# Patient Record
Sex: FEMALE | Race: WHITE
[De-identification: names, ages, dates, MRNs, and addresses within clinical notes are randomized per-mention and may not be internally consistent; named-entity substitution may affect disease eponyms.]

---

## 2017-08-29 ENCOUNTER — HOSPITAL ENCOUNTER (OUTPATIENT)
Dept: HOSPITAL 47 - RADCTMAIN | Age: 32
Discharge: HOME | End: 2017-08-29
Payer: COMMERCIAL

## 2017-08-29 VITALS — RESPIRATION RATE: 14 BRPM

## 2017-08-29 VITALS — HEART RATE: 77 BPM | SYSTOLIC BLOOD PRESSURE: 120 MMHG | DIASTOLIC BLOOD PRESSURE: 74 MMHG

## 2017-08-29 DIAGNOSIS — R19.4: Primary | ICD-10-CM

## 2017-08-29 NOTE — CT
EXAMINATION TYPE: CT abdomen pelvis w con

 

DATE OF EXAM: 8/29/2017

 

REFERENCE: NONE

 

HISTORY: R10.84 abdominal pain

 

FINDINGS: There was immediate extravasation of contrast. The CT scan was not completed. The patient h
as been rescheduled.

## 2018-03-09 ENCOUNTER — HOSPITAL ENCOUNTER (OUTPATIENT)
Dept: HOSPITAL 47 - RADMRIMAIN | Age: 33
Discharge: HOME | End: 2018-03-09
Payer: COMMERCIAL

## 2018-03-09 DIAGNOSIS — M51.25: Primary | ICD-10-CM

## 2018-03-09 PROCEDURE — 72148 MRI LUMBAR SPINE W/O DYE: CPT

## 2018-03-10 NOTE — MR
EXAMINATION TYPE: MR lumbar spine wo con

 

DATE OF EXAM: 3/9/2018

 

COMPARISON: MRI lumbar spine July 30, 2014.

 

HISTORY: LBP per order, back pain causing lt leg numbness x several years per patient.

 

TECHNIQUE: Multiplanar, multisequence imaging of the lumbar spine is performed without IV contrast.

 

FINDINGS: Sagittal images of the lumbar spine show vertebral body heights and alignment to remain sat
isfactory. There is persistent disc desiccation with mild to moderate disc space narrowing L2-L3 leve
l otherwise the intervertebral discs demonstrate normal heights and hydration. There is posterior dis
c herniation effacing anterior thecal sac T12-L1 level on sagittal image 8 redemonstrated.  The conus
 medullaris is normal in position and signal ending T12-L1 disc space level.  The bone marrow signal 
intensity is within normal limits.

 

Axial images at the T12-L1 level shows broad-based right paracentral disc protrusion effacing anterol
ateral thecal sac on axial image 28, this is not significantly changed from prior study. Bilateral ne
ural foramina remain patent.

 

Axial images at L1-L2, L2-L3, and L3-L4 levels are felt to remain within normal limits.

 

Axial images at L4-L5 level redemonstrate broad-based posterior disc protrusion with minimal facet ar
thropathy bilaterally. Spinal canal is however preserved and bilateral neural foramina remain patent.
 No significant change from prior study.

 

Axial images at L5-S1 level are felt to remain within normal limits.

 

There is 6 mm round T2 hyperintense lesion posteriorly in left kidney axial image 23 felt to reflect 
simple cyst not significantly changed from prior.

 

IMPRESSION: No significant change from prior MRI, disc herniation T12-L1 level not mentioned on prior
 report noted but is felt stable from prior

## 2019-04-11 ENCOUNTER — HOSPITAL ENCOUNTER (OUTPATIENT)
Dept: HOSPITAL 47 - ORWHC2ENDO | Age: 34
Discharge: HOME | End: 2019-04-11
Payer: COMMERCIAL

## 2019-04-11 VITALS — TEMPERATURE: 99 F | RESPIRATION RATE: 16 BRPM

## 2019-04-11 VITALS — SYSTOLIC BLOOD PRESSURE: 103 MMHG | HEART RATE: 77 BPM | DIASTOLIC BLOOD PRESSURE: 76 MMHG

## 2019-04-11 VITALS — BODY MASS INDEX: 44.6 KG/M2

## 2019-04-11 DIAGNOSIS — K21.9: ICD-10-CM

## 2019-04-11 DIAGNOSIS — K57.30: ICD-10-CM

## 2019-04-11 DIAGNOSIS — L98.9: ICD-10-CM

## 2019-04-11 DIAGNOSIS — Z87.891: ICD-10-CM

## 2019-04-11 DIAGNOSIS — R19.7: ICD-10-CM

## 2019-04-11 DIAGNOSIS — Z88.0: ICD-10-CM

## 2019-04-11 DIAGNOSIS — E66.01: ICD-10-CM

## 2019-04-11 DIAGNOSIS — Z91.040: ICD-10-CM

## 2019-04-11 DIAGNOSIS — J45.909: ICD-10-CM

## 2019-04-11 DIAGNOSIS — K29.50: Primary | ICD-10-CM

## 2019-04-11 DIAGNOSIS — G43.909: ICD-10-CM

## 2019-04-11 DIAGNOSIS — Z88.8: ICD-10-CM

## 2019-04-11 DIAGNOSIS — K44.9: ICD-10-CM

## 2019-04-11 PROCEDURE — 88305 TISSUE EXAM BY PATHOLOGIST: CPT

## 2019-04-11 PROCEDURE — 81025 URINE PREGNANCY TEST: CPT

## 2019-04-11 PROCEDURE — 45380 COLONOSCOPY AND BIOPSY: CPT

## 2019-04-11 PROCEDURE — 43239 EGD BIOPSY SINGLE/MULTIPLE: CPT

## 2019-04-11 RX ADMIN — POTASSIUM CHLORIDE SCH MLS: 14.9 INJECTION, SOLUTION INTRAVENOUS at 13:51

## 2019-04-11 RX ADMIN — POTASSIUM CHLORIDE SCH MLS: 14.9 INJECTION, SOLUTION INTRAVENOUS at 14:49

## 2019-04-11 NOTE — P.GSHP
History of Present Illness


H&P Date: 19











CHIEF COMPLAINT: GERD and colon screen





HISTORY OF PRESENT ILLNESS: The patient is a 33-year-old female who


presents with gastroesophageal reflux disease and need for colon screen.  


Upper and lower endoscopy were offered for further evaluation and management.





PAST MEDICAL HISTORY: 


Please see list.





PAST SURGICAL HISTORY: 


Please see list.





MEDICATIONS: 


Please see list.





ALLERGIES:  Please see list. 





SOCIAL HISTORY: No illicit drug use





FAMILY HISTORY: No reports of Crohn disease or ulcerative colitis. 





REVIEW OF ORGAN SYSTEMS: 


CONSTITUTIONAL: No reports of fevers or chills. 


GI:  Denies any blood in stools or constipation. 





PHYSICAL EXAM: 


VITAL SIGNS:  Stable


GENERAL: Well-developed pleasant in no acute distress. 


HEENT: No scleral icterus. Extraocular movements grossly


intact. Moist buccal mucosa. 


NECK: Supple without lymphadenopathy. 


CHEST: Unlabored respirations. Equal bilateral excursions. 


CARDIOVASCULAR: Regular rate and rhythm. Distal 2+ pulses. 


ABDOMEN: Soft, nondistended.  


MUSCULOSKELETAL: No clubbing, cyanosis, or edema. 





ASSESSMENT: 


1.  Gastroesophageal reflux disease


2.  Colon screen.





PLAN: 


1. Recommend proceeding with an upper and lower endoscopy





Past Medical History


Past Medical History: Asthma, GERD/Reflux, Skin Disorder


Additional Past Medical History / Comment(s): abdominal pain,abdominal bloating,

MIGRAINE HEADACHE


History of Any Multi-Drug Resistant Organisms: None Reported


Past Surgical History:  Section, Cholecystectomy, Ear Surgery


Additional Past Surgical History / Comment(s): EAR SX AS CHILD


Past Anesthesia/Blood Transfusion Reactions: Motion Sickness, Postoperative 

Nausea & Vomiting (PONV)


Smoking Status: Former smoker





- Past Family History


  ** Mother


Family Medical History: No Reported History





Medications and Allergies


                                Home Medications











 Medication  Instructions  Recorded  Confirmed  Type


 


Acetaminophen [Tylenol] 500 mg PO Q4-6H PRN 18 History


 


Antacid Chews And Gas Relief  1 - 2 tab PO TID PRN 18 History


 


diphenhydrAMINE [Benadryl] 25 - 50 mg PO HS PRN 18 History








                                    Allergies











Allergy/AdvReac Type Severity Reaction Status Date / Time


 


latex Allergy  Rash/Hives Verified 19 09:19


 


NSAIDS (Non-Steroidal Allergy  Swelling Verified 19 09:20





Anti-Inflamma   OF LIPS  





   AND FACE  


 


Penicillins Allergy  Unknown Verified 19 09:19





   Childhood

## 2019-04-11 NOTE — P.PCN
Date of Procedure: 04/11/19


Description of Procedure: 











PREOPERATIVE DIAGNOSIS:


Altered bowel function


Diarrhea





POSTOPERATIVE DIAGNOSIS:


Altered bowel function


Diarrhea





OPERATION:


Colonoscopy to the ileocecal valve and appendiceal orifice.


Colonoscopy with random cold biopsies of the colon for microscopic colitis





SURGEON: Briana Airta MD.





ANESTHESIA: MAC.





INDICATIONS:


The patient is a 33-year-old female who presents with change in bowel habits 

including diarrhea.  Colonoscopy with random biopsies were described to evaluate

for microscopic colitis.  Benefits and risks were described and informed consent

was obtained.





DESCRIPTION OF PROCEDURE:


The patient had undergone Gatorade, MiraLAX and Dulcolax prep. She had been 

brought into the operating room and laid in the left lateral decubitus position.

After adequate intravenous sedation, the rectum was examined with 2% lidocaine 

jelly. No external hemorrhoids were encountered. The rectal tone was within 

normal limits. No lesions were palpated in the rectal vault. An Olympus 

colonoscope was advanced until the ileocecal valve and appendiceal orifice were 

clearly viewed.  The prep was fair with clear visualization of the mucosal 

folds.   The scope was removed with visualization of each mucosal fold.  

Scattered diverticulosis was encountered.  No colonic polyps were found.  No 

evidence of focal colitis was found. Retroflexion of the scope demonstrated no 

internal hemorrhoids without active bleeding or inflammation. The colon was 

desufflated. The patient had tolerated the procedure well. 





Withdrawal time was over 6 minutes.





FINDINGS:


Aronchik preparation quality scale 2 (1-5)


No internal hemorrhoids


No external prolapsed hemorrhoids.


No arteriovenous malformations.


No adenomatous polyps.





RECOMMENDATIONS:


Lower endoscopy as needed





Plan - Discharge Summary


Discharge Rx Participant: Yes


New Discharge Prescriptions: 


No Action


   Acetaminophen [Tylenol] 500 mg PO Q4-6H PRN


     PRN Reason: Pain


   Antacid Chews And Gas Relief  1 - 2 tab PO TID PRN


     PRN Reason: gerd


   diphenhydrAMINE [Benadryl] 25 - 50 mg PO HS PRN


     PRN Reason: Pain


Discharge Medication List





Acetaminophen [Tylenol] 500 mg PO Q4-6H PRN 12/17/18 [History]


Antacid Chews And Gas Relief  1 - 2 tab PO TID PRN 12/17/18 [History]


diphenhydrAMINE [Benadryl] 25 - 50 mg PO HS PRN 12/17/18 [History]








Follow up Appointment(s)/Referral(s): 


Briana Arita MD [STAFF PHYSICIAN] - 04/23/19 ([facility])


Patient Instructions/Handouts:  Gastroesophageal Reflux Disease (DC), *Surgery 

[facility] - (Anesthesia) Endoscopy Discharge Instructions, Colonoscopy (DC)


Discharge Disposition: HOME SELF-CARE

## 2019-04-11 NOTE — P.PCN
Date of Procedure: 04/11/19


Description of Procedure: 











PREOPERATIVE DIAGNOSIS:


Gastroesophageal reflux disease.


Morbid obesity.





POSTOPERATIVE DIAGNOSIS:


Morbid obesity.


Gastritis, chronic superficial


Gastric polyps


Gastroesophageal reflux disease.





OPERATION:


Esophagogastroduodenoscopy with biopsies along antrum.





SURGEON: Briana Arita MD





ANESTHESIA: MAC.





INDICATIONS:


The patient is a 33-year-old female who presents with a history of reflux 

disease. Benefits and risks of the procedure were described. Informed consent 

was obtained.





DESCRIPTION:


The patient was brought into the endoscopy suite and laid in the left lateral 

decubitus position. An Olympus gastroscope was passed along the posterior 

oropharynx down to the distal esophagus where the squamocolumnar junction was 

encountered at 40 cm from the incisors. The stomach was entered and no bile 

reflux was found.  Additional findings are listed below. Biopsies with cold 

forceps were obtained of the antrum. The first through third portion of the 

duodenum was examined and unremarkable. Retroflexion of the scope confirmed  

Hill grade 2 lower esophageal valve. The squamocolumnar junction demonstrated no

LA grade A erosive esophagitis. The stomach was desufflated. The patient 

tolerated the procedure well.





FINDINGS:


Squamocolumnar junction 40 cm from the incisors.


Diaphragmatic hiatus at 40 cm.


Hill grade 2 lower esophageal valve.


No LA grade A erosive esophagitis.


No active duodenitis.


Chronic gastritis





RECOMMENDATIONS:


Upper endoscopy as needed.

## 2024-12-10 ENCOUNTER — HOSPITAL ENCOUNTER (OUTPATIENT)
Dept: HOSPITAL 47 - LABWHC1 | Age: 39
Discharge: HOME | End: 2024-12-10
Attending: INTERNAL MEDICINE
Payer: COMMERCIAL

## 2024-12-10 DIAGNOSIS — R93.5: Primary | ICD-10-CM

## 2024-12-10 LAB
ANION GAP SERPL CALC-SCNC: 11.4 MMOL/L (ref 4–12)
BUN SERPL-SCNC: 16.7 MG/DL (ref 9–27)
BUN/CREAT SERPL: 27.83 RATIO (ref 12–20)
CALCIUM SPEC-MCNC: 9 MG/DL (ref 8.7–10.3)
CHLORIDE SERPL-SCNC: 107 MMOL/L (ref 96–109)
CO2 SERPL-SCNC: 24.6 MMOL/L (ref 21.6–31.8)
ERYTHROCYTE [DISTWIDTH] IN BLOOD BY AUTOMATED COUNT: 4.37 X 10*6/UL (ref 4.1–5.2)
ERYTHROCYTE [DISTWIDTH] IN BLOOD: 12.1 % (ref 11.5–14.5)
GLUCOSE SERPL-MCNC: 105 MG/DL (ref 70–110)
HCT VFR BLD AUTO: 40.6 % (ref 37.2–46.3)
HGB BLD-MCNC: 13 G/DL (ref 12–15)
MCH RBC QN AUTO: 29.7 PG (ref 27–32)
MCHC RBC AUTO-ENTMCNC: 32 G/DL (ref 32–37)
MCV RBC AUTO: 92.9 FL (ref 80–97)
NRBC BLD AUTO-RTO: 0 X 10*3/UL (ref 0–0.01)
PLATELET # BLD AUTO: 316 X 10*3/UL (ref 140–440)
POTASSIUM SERPL-SCNC: 4.3 MMOL/L (ref 3.5–5.5)
SODIUM SERPL-SCNC: 143 MMOL/L (ref 135–145)
WBC # BLD AUTO: 10.92 X 10*3/UL (ref 4.5–10)

## 2024-12-10 PROCEDURE — 85027 COMPLETE CBC AUTOMATED: CPT

## 2024-12-10 PROCEDURE — 80048 BASIC METABOLIC PNL TOTAL CA: CPT

## 2024-12-10 PROCEDURE — 36415 COLL VENOUS BLD VENIPUNCTURE: CPT

## 2024-12-12 ENCOUNTER — HOSPITAL ENCOUNTER (OUTPATIENT)
Dept: HOSPITAL 47 - CATHCVL | Age: 39
End: 2024-12-12
Attending: INTERNAL MEDICINE
Payer: COMMERCIAL

## 2024-12-12 VITALS — RESPIRATION RATE: 16 BRPM | TEMPERATURE: 98 F | HEART RATE: 74 BPM

## 2024-12-12 VITALS — SYSTOLIC BLOOD PRESSURE: 129 MMHG | DIASTOLIC BLOOD PRESSURE: 79 MMHG

## 2024-12-12 DIAGNOSIS — R94.39: Primary | ICD-10-CM

## 2024-12-12 DIAGNOSIS — Z79.899: ICD-10-CM

## 2024-12-12 DIAGNOSIS — Z88.0: ICD-10-CM

## 2024-12-12 LAB — GLUCOSE BLD-MCNC: 105 MG/DL (ref 70–110)

## 2024-12-12 PROCEDURE — 99152 MOD SED SAME PHYS/QHP 5/>YRS: CPT

## 2024-12-12 PROCEDURE — 81025 URINE PREGNANCY TEST: CPT

## 2024-12-12 PROCEDURE — 93458 L HRT ARTERY/VENTRICLE ANGIO: CPT

## 2024-12-12 RX ADMIN — VERAPAMIL HYDROCHLORIDE ONE ML: 2.5 INJECTION, SOLUTION INTRAVENOUS at 07:45

## 2024-12-12 RX ADMIN — LIDOCAINE HYDROCHLORIDE ONE ML: 10 INJECTION, SOLUTION INFILTRATION; PERINEURAL at 07:40

## 2024-12-12 RX ADMIN — POTASSIUM CHLORIDE ONE MLS: 14.9 INJECTION, SOLUTION INTRAVENOUS at 07:21

## 2024-12-12 RX ADMIN — CEFAZOLIN SCH MLS/HR: 330 INJECTION, POWDER, FOR SOLUTION INTRAMUSCULAR; INTRAVENOUS at 07:20

## 2024-12-12 RX ADMIN — HEPARIN SODIUM ONE UNIT: 1000 INJECTION INTRAVENOUS; SUBCUTANEOUS at 07:47

## 2024-12-12 RX ADMIN — ATORVASTATIN CALCIUM STA: 80 TABLET, FILM COATED ORAL at 07:21

## 2024-12-12 RX ADMIN — MIDAZOLAM ONE MG: 1 INJECTION INTRAMUSCULAR; INTRAVENOUS at 07:34

## 2024-12-12 RX ADMIN — FENTANYL CITRATE ONE MCG: 50 INJECTION, SOLUTION INTRAMUSCULAR; INTRAVENOUS at 07:41

## 2024-12-12 RX ADMIN — FENTANYL CITRATE ONE MCG: 50 INJECTION, SOLUTION INTRAMUSCULAR; INTRAVENOUS at 07:37

## 2024-12-12 RX ADMIN — IOPAMIDOL ONE ML: 755 INJECTION, SOLUTION INTRAVENOUS at 07:52

## 2024-12-12 RX ADMIN — MIDAZOLAM ONE MG: 1 INJECTION INTRAMUSCULAR; INTRAVENOUS at 07:41

## 2024-12-12 RX ADMIN — ASPIRIN 325 MG ORAL TABLET STA: 325 PILL ORAL at 07:21

## 2024-12-12 NOTE — CC
CARDIAC CATHETERIZATION REPORT



INDICATION:

Abnormal stress test.



PROCEDURE NOTE:

After obtaining informed consent, left heart catheterization and coronary angiogram

were performed via the right radial artery using standard Andrew catheters.  The

patient tolerated the procedure well without any obvious immediate complications.  The

patient received moderate conscious sedation.  Total sedation time was 21 minutes.



Right radial artery access was obtained using Seldinger technique, a 6-[place] sheath

was placed.  Catheters and wires were floated into the ascending aorta under

fluoroscopic guidance.  The patient received verapamil and heparin per protocol, and a

TR band will be used for hemostasis.



FINDINGS:

Hemodynamics:

1. Left ventricular end-diastolic pressure is 17 mm.  There is no significant gradient

    across the aortic valve.

2. Left ventriculogram:  Left ventriculogram is not performed.

Angiographic data:

1. Right coronary artery:  The right coronary artery is a large dominant vessel and is

    free of significant stenosis.

2. Left main coronary artery is a normal-sized vessel and is free of disease, it

    divides into left anterior descending coronary artery and circumflex coronary

    artery.

3. LAD and its branches, circumflex coronary artery, and its branches are free of

    significant stenosis.



CONCLUSIONS:

1. Normal coronary arteries.

2. False-positive stress test.



PLAN:

I reviewed angiographic data with the patient and advised her on risk factor

modification at this time.



MMODL / IJN: [id number] / Job#: [id number]